# Patient Record
Sex: MALE | Race: WHITE
[De-identification: names, ages, dates, MRNs, and addresses within clinical notes are randomized per-mention and may not be internally consistent; named-entity substitution may affect disease eponyms.]

---

## 2020-02-16 ENCOUNTER — HOSPITAL ENCOUNTER (INPATIENT)
Dept: HOSPITAL 62 - ER | Age: 75
LOS: 2 days | Discharge: HOME | DRG: 556 | End: 2020-02-18
Attending: INTERNAL MEDICINE | Admitting: INTERNAL MEDICINE
Payer: OTHER GOVERNMENT

## 2020-02-16 DIAGNOSIS — E11.9: ICD-10-CM

## 2020-02-16 DIAGNOSIS — Z86.73: ICD-10-CM

## 2020-02-16 DIAGNOSIS — M79.81: Primary | ICD-10-CM

## 2020-02-16 DIAGNOSIS — Z79.01: ICD-10-CM

## 2020-02-16 DIAGNOSIS — T45.515A: ICD-10-CM

## 2020-02-16 DIAGNOSIS — Z79.84: ICD-10-CM

## 2020-02-16 DIAGNOSIS — M19.90: ICD-10-CM

## 2020-02-16 DIAGNOSIS — J44.9: ICD-10-CM

## 2020-02-16 DIAGNOSIS — E78.5: ICD-10-CM

## 2020-02-16 DIAGNOSIS — Z82.3: ICD-10-CM

## 2020-02-16 DIAGNOSIS — I10: ICD-10-CM

## 2020-02-16 DIAGNOSIS — D68.32: ICD-10-CM

## 2020-02-16 DIAGNOSIS — Z82.49: ICD-10-CM

## 2020-02-16 DIAGNOSIS — F17.210: ICD-10-CM

## 2020-02-16 DIAGNOSIS — Z88.0: ICD-10-CM

## 2020-02-16 LAB
ADD MANUAL DIFF: NO
ALBUMIN SERPL-MCNC: 3.6 G/DL (ref 3.5–5)
ALP SERPL-CCNC: 77 U/L (ref 38–126)
ANION GAP SERPL CALC-SCNC: 9 MMOL/L (ref 5–19)
APPEARANCE UR: CLEAR
APTT PPP: YELLOW S
AST SERPL-CCNC: 21 U/L (ref 17–59)
BASOPHILS # BLD AUTO: 0.1 10^3/UL (ref 0–0.2)
BASOPHILS NFR BLD AUTO: 0.8 % (ref 0–2)
BILIRUB DIRECT SERPL-MCNC: 0 MG/DL (ref 0–0.4)
BILIRUB SERPL-MCNC: 0.7 MG/DL (ref 0.2–1.3)
BILIRUB UR QL STRIP: NEGATIVE
BUN SERPL-MCNC: 15 MG/DL (ref 7–20)
CALCIUM: 8.8 MG/DL (ref 8.4–10.2)
CHLORIDE SERPL-SCNC: 100 MMOL/L (ref 98–107)
CO2 SERPL-SCNC: 26 MMOL/L (ref 22–30)
EOSINOPHIL # BLD AUTO: 0.1 10^3/UL (ref 0–0.6)
EOSINOPHIL NFR BLD AUTO: 1.6 % (ref 0–6)
ERYTHROCYTE [DISTWIDTH] IN BLOOD BY AUTOMATED COUNT: 13.6 % (ref 11.5–14)
GLUCOSE SERPL-MCNC: 187 MG/DL (ref 75–110)
GLUCOSE UR STRIP-MCNC: 50 MG/DL
HCT VFR BLD CALC: 43.8 % (ref 37.9–51)
HGB BLD-MCNC: 15.5 G/DL (ref 13.5–17)
INR PPP: 5.85
KETONES UR STRIP-MCNC: NEGATIVE MG/DL
LYMPHOCYTES # BLD AUTO: 1.1 10^3/UL (ref 0.5–4.7)
LYMPHOCYTES NFR BLD AUTO: 11.7 % (ref 13–45)
MCH RBC QN AUTO: 31.6 PG (ref 27–33.4)
MCHC RBC AUTO-ENTMCNC: 35.5 G/DL (ref 32–36)
MCV RBC AUTO: 89 FL (ref 80–97)
MONOCYTES # BLD AUTO: 1 10^3/UL (ref 0.1–1.4)
MONOCYTES NFR BLD AUTO: 10.9 % (ref 3–13)
NEUTROPHILS # BLD AUTO: 7.1 10^3/UL (ref 1.7–8.2)
NEUTS SEG NFR BLD AUTO: 75 % (ref 42–78)
PH UR STRIP: 5 [PH] (ref 5–9)
PLATELET # BLD: 266 10^3/UL (ref 150–450)
POTASSIUM SERPL-SCNC: 3.5 MMOL/L (ref 3.6–5)
PROT SERPL-MCNC: 6.1 G/DL (ref 6.3–8.2)
PROT UR STRIP-MCNC: NEGATIVE MG/DL
PROTHROMBIN TIME: 54.2 SEC (ref 11.4–15.4)
RBC # BLD AUTO: 4.91 10^6/UL (ref 4.35–5.55)
SP GR UR STRIP: > 1.06
TOTAL CELLS COUNTED % (AUTO): 100 %
UROBILINOGEN UR-MCNC: NEGATIVE MG/DL (ref ?–2)
WBC # BLD AUTO: 9.4 10^3/UL (ref 4–10.5)

## 2020-02-16 PROCEDURE — 83735 ASSAY OF MAGNESIUM: CPT

## 2020-02-16 PROCEDURE — 36415 COLL VENOUS BLD VENIPUNCTURE: CPT

## 2020-02-16 PROCEDURE — 81001 URINALYSIS AUTO W/SCOPE: CPT

## 2020-02-16 PROCEDURE — 85610 PROTHROMBIN TIME: CPT

## 2020-02-16 PROCEDURE — 80053 COMPREHEN METABOLIC PANEL: CPT

## 2020-02-16 PROCEDURE — 96374 THER/PROPH/DIAG INJ IV PUSH: CPT

## 2020-02-16 PROCEDURE — 93010 ELECTROCARDIOGRAM REPORT: CPT

## 2020-02-16 PROCEDURE — 82962 GLUCOSE BLOOD TEST: CPT

## 2020-02-16 PROCEDURE — 83690 ASSAY OF LIPASE: CPT

## 2020-02-16 PROCEDURE — 74022 RADEX COMPL AQT ABD SERIES: CPT

## 2020-02-16 PROCEDURE — 99285 EMERGENCY DEPT VISIT HI MDM: CPT

## 2020-02-16 PROCEDURE — 96361 HYDRATE IV INFUSION ADD-ON: CPT

## 2020-02-16 PROCEDURE — 85027 COMPLETE CBC AUTOMATED: CPT

## 2020-02-16 PROCEDURE — 83036 HEMOGLOBIN GLYCOSYLATED A1C: CPT

## 2020-02-16 PROCEDURE — 74177 CT ABD & PELVIS W/CONTRAST: CPT

## 2020-02-16 PROCEDURE — 93005 ELECTROCARDIOGRAM TRACING: CPT

## 2020-02-16 PROCEDURE — 85025 COMPLETE CBC W/AUTO DIFF WBC: CPT

## 2020-02-16 PROCEDURE — 96375 TX/PRO/DX INJ NEW DRUG ADDON: CPT

## 2020-02-16 RX ADMIN — FENTANYL CITRATE PRN MCG: 50 INJECTION INTRAMUSCULAR; INTRAVENOUS at 21:49

## 2020-02-16 RX ADMIN — FENTANYL CITRATE PRN MCG: 50 INJECTION INTRAMUSCULAR; INTRAVENOUS at 23:08

## 2020-02-16 NOTE — ER DOCUMENT REPORT
ED Medical Screen (RME)





- General


Chief Complaint: Abdominal Pain


Stated Complaint: ABDOMINAL PAIN


Time Seen by Provider: 02/16/20 19:16


Mode of Arrival: Wheelchair


Information source: Patient


Notes: 





74-year-old male presented to ED for complaint of lower left quadrant abdominal 

pain for the last for 5 days.  He states he was laid in the bed for about 2 

weeks for nausea vomiting diarrhea and fever before this pain started.  He 

states he has had more multiple colonoscopies and is never been told he had 

diverticulitis he states he has had a few strokes and is diabetic type II.  He 

does smoke about 5 or 6 cigarettes a day.  States he has not drank alcohol in 

the last year denies use of illicit drugs.  He states he does live alone.  He 

states the last time he had a fever was about 5 days ago he woke up was hungry 

and then he started with the abdominal pain the next day.  States his last bowel

movement was yesterday and was soft.  He states he is urinating with no 

problems.

















I have greeted and performed a rapid initial assessment of this patient.  A 

comprehensive ED assessment and evaluation of the patient, analysis of test 

results and completion of medical decision making process will be conducted by 

an additional ED providers.  Urinating okay


TRAVEL OUTSIDE OF THE [place] IN LAST 30 DAYS: No





- Related Data


Allergies/Adverse Reactions: 


                                        





Penicillins Allergy (Unknown, Verified 11/27/15 07:28)


   











Past Medical History





- Past Medical History


Cardiac Medical History: Reports: Hx Hypertension


Pulmonary Medical History: Reports: Hx COPD


   Denies: Hx Tuberculosis


Endocrine Medical History: Reports: Hx Diabetes Mellitus Type 2


Psychiatric Medical History: 


   Denies: Hx Depression


Past Surgical History: Reports: Hx Tonsillectomy





- Immunizations


Hx Diphtheria, Pertussis, Tetanus Vaccination: Yes

## 2020-02-16 NOTE — RADIOLOGY REPORT (SQ)
EXAM DESCRIPTION: 



XR ABDOMEN SUPINE AND ERECT WITH CHEST (ABD ACUTE SERIES)



COMPLETED DATE/TME:  02/16/2020 00:00



CLINICAL HISTORY: 



74 years, Male, ABDOMINAL PAIN



COMPARISON:

None.



NUMBER OF VIEWS:





TECHNIQUE:





LIMITATIONS:

None.



FINDINGS:



There are multiple loops of normal caliber, but gas containing

small bowel in the abdomen and pelvis, possibly small bowel

ileus.



No free air.



No evidence of pulmonary infiltrate or pleural effusion.



The heart and mediastinum are unremarkable



Pulmonary vascularity appears normal.



There are atherosclerotic changes and tortuosity of the thoracic

aorta.



There is an old fracture of the lateral right clavicle.



IMPRESSION:



Possible small bowel ileus.

 



copyright 2011 [employer] [facility]- All Rights Reserved

## 2020-02-16 NOTE — RADIOLOGY REPORT (SQ)
EXAM DESCRIPTION: 



CT ABDOMEN PELVIS WITH IV CONTRAST



COMPLETED DATE/TME:  02/16/2020 21:03



CLINICAL HISTORY: 



74 years, Male, Left lower quadrant abdominal pain



COMPARISON:

None.



TECHNIQUE:

Images stored on PACS.

 

All CT scanners at this facility use dose modulation, iterative

reconstruction, and/or weight based dosing when appropriate to

reduce radiation dose to as low as reasonably achievable (ALARA).





[facility]: Dose Right [facility]: CareDose   [facility]: Dose Right    [facility]:

Teradose 4D    [facility]: Smart Technologies



LIMITATIONS:

None.



FINDINGS:



Lung base is grossly clear. The heart is of normal size. No

effusion.



The liver is homogeneous. Gallbladder is nondistended. The

pancreas spleen and adrenals are unremarkable.. Nonobstructing

nephrolithiasis of the right kidney, the largest measuring 5 mm.

No obstructive uropathy hydronephrosis or hydroureter.



The bowel is nonobstructed. Diverticulosis without acute

diverticulitis. The appendix is normal.



Rectus sheath hematoma is identified left lateral. This is

moderate-sized.



Age-appropriate osteoarthritis





IMPRESSION:



Rectus sheath hematoma, left lateral

 

TECHNICAL DOCUMENTATION:



Quality ID # 436: Final reports with documentation of one or more

dose reduction techniques (e.g., Automated exposure control,

adjustment of the mA and/or kV according to patient size, use of

iterative reconstruction technique)



copyright 2011 [facility] [employer]- All Rights Reserved

## 2020-02-16 NOTE — ER DOCUMENT REPORT
ED General





- General


Chief Complaint: Abdominal Pain


Stated Complaint: ABDOMINAL PAIN


Time Seen by Provider: 02/16/20 19:16


Primary Care Provider: 


[facility] [[facility] - Follow up as needed


Mode of Arrival: Wheelchair


TRAVEL OUTSIDE OF THE [place] IN LAST 30 DAYS: No





- HPI


Notes: 





Mr. Neff is a 74-year-old male [facility] with multiple chronic medical 

problems now presenting with a 3 to 4-day history of worsening left lower 

quadrant abdominal pain aggravated by movement.  Several loose stools today.  No

recent antibiotic therapy.  States that he had a colonoscopy about 5 years ago 

was told he had benign polyps.  No abdominal surgery.  No known history of 

diverticulosis.  No urinary symptoms.  Nausea without vomiting.


Pain is currently 3/10 intensity.





Patient is currently being treated for hypertension, hyperlipidemia, COPD, d

iabetes mellitus type 2, old CVA and osteoarthritis.





Medications include clonidine, metformin, warfarin.





History of anaphylactic reaction to penicillin.





Smokes 1/4 pack cigarettes per day.  Denies alcohol consumption.  No history of 

drug abuse.








- Related Data


Allergies/Adverse Reactions: 


                                        





Penicillins Allergy (Unknown, Verified 11/27/15 07:28)


   











Past Medical History





- General


Information source: Patient, Relative





- Social History


Smoking Status: Current Every Day Smoker


Frequency of alcohol use: None


Drug Abuse: None


Lives with: Family


Family History: CAD, CVA, Malignancy


Patient has suicidal ideation: No


Patient has homicidal ideation: No





- Past Medical History


Cardiac Medical History: Reports: Hx Hypertension


Pulmonary Medical History: Reports: Hx COPD


   Denies: Hx Tuberculosis


Neurological Medical History: Reports: Hx Cerebrovascular Accident


Endocrine Medical History: Reports: Hx Diabetes Mellitus Type 2


GI Medical History: Reports: Hx Colonoscopy


Musculoskeletal Medical History: Reports Hx Arthritis


Psychiatric Medical History: 


   Denies: Hx Depression


Past Surgical History: Reports: Hx Oral Surgery - widsom teeth, Hx Tonsillectomy





- Immunizations


Hx Diphtheria, Pertussis, Tetanus Vaccination: Yes


Hx Pneumococcal Vaccination: 11/01/12





Review of Systems





- Review of Systems


Notes: 





Constitutional: Negative for fever.


HENT: Negative for sore throat.


Eyes: Negative for visual changes.


Cardiovascular: Negative for chest pain.


Respiratory: Negative for shortness of breath.


Gastrointestinal: As per HPI.


Genitourinary: Negative for dysuria.


Musculoskeletal: Negative for back pain.


Skin: Negative for rash.


Neurological: Negative for headaches, weakness or numbness.





10 point ROS negative except as marked above and in HPI.








Physical Exam





- Vital signs


Vitals: 


                                        











Temp Pulse Resp BP Pulse Ox


 


 98.4 F   99   20   119/68   95 


 


 02/16/20 19:16  02/16/20 19:16  02/16/20 19:16  02/16/20 19:16  02/16/20 19:16














- Notes


Notes: 











GENERAL: Well-developed well-nourished appearing in no acute distress.





SKIN: Good turgor no rashes.





HEAD: Normocephalic atraumatic.





EYES: PERRLA.  EOMI.  Conjunctivae and sclerae clear.





EARS: CANALS AND TMS CLEAR.





NOSE: CLEAR.





MOUTH: Moist mucosa.  Poor dentition.  No stridor or edema.  No drooling.





NECK: Supple.  No masses or thyromegaly.  No adenopathy.  Carotids 2+ without 

bruits.  No JVD.





BACK: Symmetrical without tenderness.





CHEST: Respirations unlabored.  Breath sounds clear and symmetrical.





HEART: Regular rhythm.  No murmur gallop or rub.





ABDOMEN: Mild obesity.  Moderate tenderness on deep palpation left lower 

quadrant.  Soft without masses, organomegaly or rebound.  Bowel sounds normally 

active.  No bruits.





GENITALIA: Deferred.





EXTREMITIES: Mild degenerative changes intrerphalangeal joints of both hands.  

No edema.  No calf tenderness.  Cap refill less than 1.5 seconds.  Dorsalis 

pedis and posterior tibial pulses 3+ and symmetrical.





NEUROLOGICAL: GCS 15.  Alert and oriented x3.  Normal gait.  Fluent speech.  

Cranial nerves II through XII intact.  Sensorimotor and cerebellar normal.  

Normal tone.





PSYCHIATRIC: Appropriate affect.





Course





- Re-evaluation


Re-evalutation: 





02/16/20 21:11


I would strongly suspect diverticulosis with diverticulitis.  CT is requested.  

Labs pending include CBC, urinalysis, comprehensive metabolic profile.


02/16/20 23:33


CT scan demonstrates a moderate size left lateral rectus sheath hematoma.  INR 

is approximately 5.5.  We will reverse the warfarin with IV vitamin K.  Case has

been discussed with on-call surgicalist YURIY Kovacs.  He does not feel there 

is any surgical intervention at this time and recommends medical admission.  

Case been discussed with on-call hospitalist Dr. Kruse who will admit him to 

telemetry.





- Vital Signs


Vital signs: 


                                        











Temp Pulse Resp BP Pulse Ox


 


 98.4 F   76   16   159/69 H  98 


 


 02/16/20 23:15  02/16/20 23:15  02/16/20 23:15  02/16/20 23:15  02/16/20 23:15














- Laboratory


Result Diagrams: 


                                 02/16/20 20:00





                                 02/16/20 20:00


Laboratory results interpreted by me: 


                                        











  02/16/20 02/16/20 02/16/20





  20:00 20:00 20:45


 


Lymph % (Auto)  11.7 L  


 


PT    54.2 H*


 


INR    5.85 H*


 


Sodium   134.7 L 


 


Potassium   3.5 L 


 


Glucose   187 H 


 


Total Protein   6.1 L 














Discharge





- Discharge


Clinical Impression: 


Rectus sheath hematoma


Qualifiers:


 Encounter type: initial encounter Qualified Code(s): S30.1XXA - Contusion of 

abdominal wall, initial encounter





Warfarin toxicity


Qualifiers:


 Encounter type: initial encounter Injury intent: accidental or unintentional 

Qualified Code(s): T45.511A - Poisoning by anticoagulants, accidental 

(unintentional), initial encounter





Condition: Good


Disposition: ADMITTED AS INPATIENT


Admitting Provider: Uriah (Hospitalist)


Unit Admitted: Telemetry


Referrals: 


[facility] [[facility] - Follow up as needed

## 2020-02-17 LAB
ADD MANUAL DIFF: NO
BASOPHILS # BLD AUTO: 0.1 10^3/UL (ref 0–0.2)
BASOPHILS NFR BLD AUTO: 0.8 % (ref 0–2)
EOSINOPHIL # BLD AUTO: 0.2 10^3/UL (ref 0–0.6)
EOSINOPHIL NFR BLD AUTO: 3.2 % (ref 0–6)
ERYTHROCYTE [DISTWIDTH] IN BLOOD BY AUTOMATED COUNT: 13 % (ref 11.5–14)
HCT VFR BLD CALC: 38.5 % (ref 37.9–51)
HGB BLD-MCNC: 13.5 G/DL (ref 13.5–17)
INR PPP: 1.71
LYMPHOCYTES # BLD AUTO: 1.3 10^3/UL (ref 0.5–4.7)
LYMPHOCYTES NFR BLD AUTO: 21.2 % (ref 13–45)
MCH RBC QN AUTO: 31.1 PG (ref 27–33.4)
MCHC RBC AUTO-ENTMCNC: 35 G/DL (ref 32–36)
MCV RBC AUTO: 89 FL (ref 80–97)
MONOCYTES # BLD AUTO: 0.8 10^3/UL (ref 0.1–1.4)
MONOCYTES NFR BLD AUTO: 13.6 % (ref 3–13)
NEUTROPHILS # BLD AUTO: 3.8 10^3/UL (ref 1.7–8.2)
NEUTS SEG NFR BLD AUTO: 61.2 % (ref 42–78)
PLATELET # BLD: 239 10^3/UL (ref 150–450)
PROTHROMBIN TIME: 20.3 SEC (ref 11.4–15.4)
RBC # BLD AUTO: 4.32 10^6/UL (ref 4.35–5.55)
TOTAL CELLS COUNTED % (AUTO): 100 %
WBC # BLD AUTO: 6.2 10^3/UL (ref 4–10.5)

## 2020-02-17 RX ADMIN — FENTANYL CITRATE PRN MCG: 50 INJECTION INTRAMUSCULAR; INTRAVENOUS at 11:30

## 2020-02-17 RX ADMIN — INSULIN LISPRO SCH: 100 INJECTION, SOLUTION INTRAVENOUS; SUBCUTANEOUS at 22:02

## 2020-02-17 RX ADMIN — INSULIN LISPRO SCH: 100 INJECTION, SOLUTION INTRAVENOUS; SUBCUTANEOUS at 10:23

## 2020-02-17 RX ADMIN — DOCUSATE SODIUM SCH: 100 CAPSULE, LIQUID FILLED ORAL at 19:01

## 2020-02-17 RX ADMIN — KETOROLAC TROMETHAMINE PRN MG: 30 INJECTION, SOLUTION INTRAMUSCULAR at 01:44

## 2020-02-17 RX ADMIN — DOCUSATE SODIUM SCH MG: 100 CAPSULE, LIQUID FILLED ORAL at 10:24

## 2020-02-17 RX ADMIN — KETOROLAC TROMETHAMINE PRN MG: 30 INJECTION, SOLUTION INTRAMUSCULAR at 20:04

## 2020-02-17 RX ADMIN — INSULIN LISPRO SCH: 100 INJECTION, SOLUTION INTRAVENOUS; SUBCUTANEOUS at 14:42

## 2020-02-17 RX ADMIN — INSULIN LISPRO SCH: 100 INJECTION, SOLUTION INTRAVENOUS; SUBCUTANEOUS at 18:59

## 2020-02-17 RX ADMIN — FENTANYL CITRATE PRN MCG: 50 INJECTION INTRAMUSCULAR; INTRAVENOUS at 06:11

## 2020-02-17 NOTE — EKG REPORT
SEVERITY:- BORDERLINE ECG -

SINUS RHYTHM

BORDERLINE T WAVE ABNORMALITIES

:

Confirmed by: Butch Anand MD 17-Feb-2020 06:11:05

## 2020-02-17 NOTE — PROGRESS NOTE
Provider Note


Provider Note: 





I was contacted by the emergency room physician about an opinion regarding a 

rectus sheath hematoma.  I have reviewed the patient's CT scan.  Surgery has no 

place in the treatment of a spontaneous rectus sheath hematoma.  Treatment 

strategies are entirely medical.  There is no surgery that will be helpful for 

this patient.  This was discussed at length with the emergency department 

physician.  I will cancel the consult for surgery.

## 2020-02-17 NOTE — PDOC PROGRESS REPORT
Subjective


Progress Note for:: 02/17/20


Subjective:: 





Patient states that still having some abdominal pain.  Denies any l

ightheadedness fever or chills.  Patient does not know why he is on warfarin.  

States that he had a brain bleed before and that he was later placed on 

warfarin.  When asked patient if he has had ischemic stroke, history of atrial 

fibrillation, blood clots, DVT PE, mechanical heart valves, patient's answers no

but states that he has had a stroke before with a brain bleed.  He is not able 

to give me any indication for proper reason for anticoagulation at this time 

[facility]


Reason For Visit: 


SUPRATHER INR RECTAL HEMATOMA








Physical Exam


Vital Signs: 


                                        











Temp Pulse Resp BP Pulse Ox


 


 97.7 F   73   16   110/56 L  96 


 


 02/17/20 11:17  02/17/20 14:00  02/17/20 11:17  02/17/20 11:17  02/17/20 11:17








                                 Intake & Output











 02/16/20 02/17/20 02/18/20





 06:59 06:59 06:59


 


Intake Total  1240 1616


 


Output Total  0 250


 


Balance  1240 1366


 


Weight  92.1 kg 92.1 kg











General appearance: PRESENT: no acute distress, cooperative


Neck exam: ABSENT: JVD


Respiratory exam: PRESENT: clear to auscultation mundo, symmetrical, unlabored.  

ABSENT: tachypnea, wheezes


Cardiovascular exam: PRESENT: RRR, +S1, +S2.  ABSENT: tachycardia


GI/Abdominal exam: PRESENT: soft.  ABSENT: rebound, rigid, tenderness


Neurological exam: PRESENT: alert, awake, oriented to person, oriented to place,

oriented to time


Psychiatric exam: ABSENT: agitated, anxious





Results


Laboratory Results: 


                                        





                                 02/17/20 05:32 





                                 02/16/20 20:00 





                                        











  02/16/20 02/16/20 02/16/20





  20:00 20:00 20:00


 


WBC  9.4  


 


RBC  4.91  


 


Hgb  15.5  


 


Hct  43.8  


 


MCV  89  


 


MCH  31.6  


 


MCHC  35.5  


 


RDW  13.6  


 


Plt Count  266  


 


Seg Neutrophils %  75.0  


 


Sodium   134.7 L 


 


Potassium   3.5 L 


 


Chloride   100 


 


Carbon Dioxide   26 


 


Anion Gap   9 


 


BUN   15 


 


Creatinine   0.94 


 


Est GFR ( Amer)   > 60 


 


Glucose   187 H 


 


Calcium   8.8 


 


Magnesium    2.0


 


Total Bilirubin   0.7 


 


AST   21 


 


Alkaline Phosphatase   77 


 


Total Protein   6.1 L 


 


Albumin   3.6 


 


Lipase   67.6 


 


Urine Color   


 


Urine Appearance   


 


Urine pH   


 


Ur Specific Gravity   


 


Urine Protein   


 


Urine Glucose (UA)   


 


Urine Ketones   


 


Urine Blood   


 


Urine RBC (Auto)   














  02/16/20 02/17/20





  23:34 05:32


 


WBC   6.2


 


RBC   4.32 L


 


Hgb   13.5


 


Hct   38.5


 


MCV   89


 


MCH   31.1


 


MCHC   35.0


 


RDW   13.0


 


Plt Count   239


 


Seg Neutrophils %   61.2


 


Sodium  


 


Potassium  


 


Chloride  


 


Carbon Dioxide  


 


Anion Gap  


 


BUN  


 


Creatinine  


 


Est GFR (African Amer)  


 


Glucose  


 


Calcium  


 


Magnesium  


 


Total Bilirubin  


 


AST  


 


Alkaline Phosphatase  


 


Total Protein  


 


Albumin  


 


Lipase  


 


Urine Color  YELLOW 


 


Urine Appearance  CLEAR 


 


Urine pH  5.0 


 


Ur Specific Gravity  > 1.060 


 


Urine Protein  NEGATIVE 


 


Urine Glucose (UA)  50 H 


 


Urine Ketones  NEGATIVE 


 


Urine Blood  SMALL H 


 


Urine RBC (Auto)  4 











Impressions: 


                                        





Acute Abdomen Series  02/16/20 00:00


IMPRESSION:


 


Possible small bowel ileus.


 


 


copyright 2011 [employer]- All Rights Reserved


 








Abdomen/Pelvis CT  02/16/20 21:03


IMPRESSION:


 


Rectus sheath hematoma, left lateral


 


TECHNICAL DOCUMENTATION:


 


Quality ID # 436: Final reports with documentation of one or more


dose reduction techniques (e.g., Automated exposure control,


adjustment of the mA and/or kV according to patient size, use of


iterative reconstruction technique)


 


copyright 2011 [employer]- All Rights Reserved


 














Assessment and Plan





- Diagnosis


(1) Rectus sheath hematoma


Qualifiers: 


   Encounter type: initial encounter   Qualified Code(s): S30.1XXA - Contusion 

of abdominal wall, initial encounter   


Is this a current diagnosis for this admission?: Yes   


Plan: 


Secondary to supratherapeutic INR and coughing fits, conservative management, 

Coumadin held


INR is 1.7 today following vitamin K IV dose yesterday.


Will place abdominal binder


Check CBC in the morning








(2) Warfarin toxicity


Qualifiers: 


   Encounter type: initial encounter   Injury intent: accidental or 

unintentional   Qualified Code(s): T45.511A - Poisoning by anticoagulants, 

accidental (unintentional), initial encounter   


Is this a current diagnosis for this admission?: Yes   


Plan: 


Patient's takes warfarin 5 mg on Monday Wednesday and Friday and 10 mg on the 

other days.


Patient is unable to tell me a clear indication for his warfarin use but states 

that [facility]


I have instructed him that I will be holding his warfarin which will remain on 

hold upon discharge until he follows up with [facility].


Of note, patient denies any history of atrial fibrillation/irregular heart 

rhythm, mechanical heart valves, PEs or DVTs.








(3) Diabetes mellitus


Is this a current diagnosis for this admission?: Yes   


Plan: 


Hemoglobin A1c is 6.9.  Hold metformin for 48 hours from the time of the CT.  

Continue sliding scale insulin and Accu-Cheks.

## 2020-02-17 NOTE — PDOC H&P
History of Present Illness


Admission Date/PCP: 


  20 23:47





  [facility]





Patient complains of: Rectal pain


History of Present Illness: 


JAYME MARCOS JR is a 74 year old male with a past medical history of hypertension 

dyslipidemia COPD diabetes, osteoarthritis and CVA without residual on Coumadin.

 Patient presents with 48 hours of rectal pain occurring after a paroxysm of 

cough.  In the emergency department he is found to have a supratherapeutic INR 

of 5.8 and a moderate rectal sheath hematoma.  He received symptomatic 

management, vitamin K and referred to the hospitalist for admission.  Patient 

admits recent influenza prompting several doses of Tamiflu but otherwise no 

changes in medications his last check of INR was over a month ago.  He otherwise

feels well and denies blood per rectum, fever or chills





Past Medical History


Cardiac Medical History: Reports: Hypertension


Pulmonary Medical History: Reports: Chronic Obstructive Pulmonary Disease (COPD)


   Denies: Tuberculosis


Endocrine Medical History: Reports: Diabetes Mellitus Type 2


Musculoskeltal Medical History: Reports: Arthritis


Psychiatric Medical History: 


   Denies: Depression





Past Surgical History


Past Surgical History: Reports: Tonsillectomy





Social History


Information Source: Patient


Lives with: Family


Smoking Status: Current Every Day Smoker


Cigarettes Packs Per Day: 0.5


Electronic Cigarette use?: No


Number of Years Smokin


Frequency of Alcohol Use: None


Hx Recreational Drug Use: No


Drugs: None


Hx Prescription Drug Abuse: No





- Advance Directive


Resuscitation Status: Full Code





Family History


Family History: CAD, CVA, Malignancy


Parental Family History Reviewed: Yes


Children Family History Reviewed: Yes


Sibling(s) Family History Reviewed.: Yes





Medication/Allergy


Home Medications: 








Metformin HCl [Glucophage 1000 mg Tablet] 1,000 mg PO DAILY 13 


Atorvastatin Calcium 40 mg PO DAILY 11/27/15 


Cyanocobalamin (Vitamin B-12) [Vitamin B12] 1,000 mcg PO DAILY 11/27/15 


Glipizide [Glucotrol 10 mg Tablet] 10 mg PO DAILY 11/27/15 


Pioglitazone HCl [Actos] 15 mg PO DAILY 11/27/15 


Topiramate 100 mg PO PRN PRN 11/27/15 


Aspirin [Ecotrin 81 mg EC Tablet] 81 mg PO DAILY #0 tabec 12/01/15 


Glipizide/Metformin HCl [Glipizide-Metformin 5-500 mg] 1 each PO BID #60 

12/01/15 


Warfarin Sodium [Coumadin 5 mg Tablet] 5 mg PO QHS #30 tablet 12/01/15 








Allergies/Adverse Reactions: 


                                        





Penicillins Allergy (Unknown, Verified 11/27/15 07:28)


   











Review of Systems


Constitutional: ABSENT: chills, fever(s), headache(s), weight gain, weight loss


Eyes: ABSENT: visual disturbances


Ears: ABSENT: hearing changes


Cardiovascular: ABSENT: chest pain, dyspnea on exertion, edema, orthropnea, 

palpitations


Respiratory: ABSENT: cough, hemoptysis


Gastrointestinal: ABSENT: abdominal pain, constipation, diarrhea, hematemesis, 

hematochezia, nausea, vomiting


Genitourinary: ABSENT: dysuria, hematuria


Musculoskeletal: ABSENT: joint swelling


Integumentary: ABSENT: rash, wounds


Neurological: ABSENT: abnormal gait, abnormal speech, confusion, dizziness, 

focal weakness, syncope


Psychiatric: ABSENT: anxiety, depression, homidical ideation, suicidal ideation


Endocrine: ABSENT: cold intolerance, heat intolerance, polydipsia, polyuria


Hematologic/Lymphatic: ABSENT: easy bleeding, easy bruising





Physical Exam


Vital Signs: 


                                        











Temp Pulse Resp BP Pulse Ox


 


 97.5 F   67   20   103/56 L  97 


 


 20 03:17  20 03:17  20 03:17  20 03:17  20 03:17








                                 Intake & Output











 02/15/20 02/16/20 02/17/20





 11:59 11:59 11:59


 


Intake Total   1000


 


Balance   1000


 


Weight   92.1 kg











General appearance: PRESENT: no acute distress, well-developed, well-nourished


Head exam: PRESENT: atraumatic, normocephalic


Eye exam: PRESENT: conjunctiva pink, EOMI, PERRLA.  ABSENT: scleral icterus


Ear exam: PRESENT: normal external ear exam


Mouth exam: PRESENT: moist, tongue midline


Neck exam: ABSENT: carotid bruit, JVD, lymphadenopathy, thyromegaly


Respiratory exam: PRESENT: clear to auscultation mundo.  ABSENT: rales, rhonchi, 

wheezes


Cardiovascular exam: PRESENT: RRR.  ABSENT: diastolic murmur, rubs, systolic 

murmur


Pulses: PRESENT: normal dorsalis pedis pul


Vascular exam: PRESENT: normal capillary refill


GI/Abdominal exam: PRESENT: normal bowel sounds, soft, tenderness - Deep 

suprapubic pain.  ABSENT: distended, guarding, mass, organolmegaly, rebound


Rectal exam: PRESENT: deferred


Extremities exam: PRESENT: full ROM.  ABSENT: calf tenderness, clubbing, pedal 

edema


Neurological exam: PRESENT: alert, awake, oriented to person, oriented to place,

oriented to time, oriented to situation, CN II-XII grossly intact.  ABSENT: 

motor sensory deficit


Psychiatric exam: PRESENT: appropriate affect, normal mood.  ABSENT: homicidal 

ideation, suicidal ideation


Skin exam: PRESENT: dry, intact, warm.  ABSENT: cyanosis, rash





Results


Laboratory Results: 


                                        





                                 20 20:00 





                                 20 20:00 





                                        











  20





  20:00 20:00 20:00


 


WBC  9.4  


 


RBC  4.91  


 


Hgb  15.5  


 


Hct  43.8  


 


MCV  89  


 


MCH  31.6  


 


MCHC  35.5  


 


RDW  13.6  


 


Plt Count  266  


 


Seg Neutrophils %  75.0  


 


Sodium   134.7 L 


 


Potassium   3.5 L 


 


Chloride   100 


 


Carbon Dioxide   26 


 


Anion Gap   9 


 


BUN   15 


 


Creatinine   0.94 


 


Est GFR ( Amer)   > 60 


 


Glucose   187 H 


 


Calcium   8.8 


 


Magnesium    2.0


 


Total Bilirubin   0.7 


 


AST   21 


 


Alkaline Phosphatase   77 


 


Total Protein   6.1 L 


 


Albumin   3.6 


 


Lipase   67.6 


 


Urine Color   


 


Urine Appearance   


 


Urine pH   


 


Ur Specific Gravity   


 


Urine Protein   


 


Urine Glucose (UA)   


 


Urine Ketones   


 


Urine Blood   


 


Urine RBC (Auto)   














  20





  23:34


 


WBC 


 


RBC 


 


Hgb 


 


Hct 


 


MCV 


 


MCH 


 


MCHC 


 


RDW 


 


Plt Count 


 


Seg Neutrophils % 


 


Sodium 


 


Potassium 


 


Chloride 


 


Carbon Dioxide 


 


Anion Gap 


 


BUN 


 


Creatinine 


 


Est GFR (African Amer) 


 


Glucose 


 


Calcium 


 


Magnesium 


 


Total Bilirubin 


 


AST 


 


Alkaline Phosphatase 


 


Total Protein 


 


Albumin 


 


Lipase 


 


Urine Color  YELLOW


 


Urine Appearance  CLEAR


 


Urine pH  5.0


 


Ur Specific Gravity  > 1.060


 


Urine Protein  NEGATIVE


 


Urine Glucose (UA)  50 H


 


Urine Ketones  NEGATIVE


 


Urine Blood  SMALL H


 


Urine RBC (Auto)  4











Impressions: 


                                        





Acute Abdomen Series  20 00:00


IMPRESSION:


 


Possible small bowel ileus.


 


 


copyright 2011 [employer]- All Rights Reserved


 








Abdomen/Pelvis CT  20 21:03


IMPRESSION:


 


Rectus sheath hematoma, left lateral


 


TECHNICAL DOCUMENTATION:


 


Quality ID # 436: Final reports with documentation of one or more


dose reduction techniques (e.g., Automated exposure control,


adjustment of the mA and/or kV according to patient size, use of


iterative reconstruction technique)


 


copyright 2011 [employer]- All Rights Reserved


 














Assessment and Plan





- Diagnosis


(1) Rectus sheath hematoma


Qualifiers: 


   Encounter type: initial encounter   Qualified Code(s): S30.1XXA - Contusion 

of abdominal wall, initial encounter   


Is this a current diagnosis for this admission?: Yes   


Plan: 


Secondary to supratherapeutic INR, conservative management, Coumadin held, 

symptomatic management clear liquid diet and stool softener.








(2) Warfarin toxicity


Qualifiers: 


   Encounter type: initial encounter   Injury intent: accidental or 

unintentional   Qualified Code(s): T45.511A - Poisoning by anticoagulants, 

accidental (unintentional), initial encounter   


Is this a current diagnosis for this admission?: Yes   


Plan: 


Complicated by hematoma, warfarin discontinued, vitamin K trial, level INR








(3) Diabetes mellitus


Is this a current diagnosis for this admission?: Yes   


Plan: 


Humalog sliding scale, follow-up A1c








- Time


Time Spent with patient: 25-34 minutes





- Inpatient Certification


Medical Necessity: Need Close Monitoring Due to Risk of Patient Decompensation

## 2020-02-18 VITALS — SYSTOLIC BLOOD PRESSURE: 128 MMHG | DIASTOLIC BLOOD PRESSURE: 64 MMHG

## 2020-02-18 LAB
ERYTHROCYTE [DISTWIDTH] IN BLOOD BY AUTOMATED COUNT: 13 % (ref 11.5–14)
HCT VFR BLD CALC: 38 % (ref 37.9–51)
HGB BLD-MCNC: 13.3 G/DL (ref 13.5–17)
MCH RBC QN AUTO: 31.4 PG (ref 27–33.4)
MCHC RBC AUTO-ENTMCNC: 35.1 G/DL (ref 32–36)
MCV RBC AUTO: 90 FL (ref 80–97)
PLATELET # BLD: 246 10^3/UL (ref 150–450)
RBC # BLD AUTO: 4.25 10^6/UL (ref 4.35–5.55)
WBC # BLD AUTO: 6.5 10^3/UL (ref 4–10.5)

## 2020-02-18 RX ADMIN — DOCUSATE SODIUM SCH: 100 CAPSULE, LIQUID FILLED ORAL at 09:12

## 2020-02-18 RX ADMIN — INSULIN LISPRO SCH: 100 INJECTION, SOLUTION INTRAVENOUS; SUBCUTANEOUS at 12:05

## 2020-02-18 RX ADMIN — INSULIN LISPRO SCH: 100 INJECTION, SOLUTION INTRAVENOUS; SUBCUTANEOUS at 08:02

## 2020-02-18 NOTE — PDOC DISCHARGE SUMMARY
Impression





- Admit/DC Date/PCP


Admission Date/Primary Care Provider: 


  02/16/20 23:47





  [facility]





Discharge Date: 02/18/20





- Discharge Diagnosis


(1) Rectus sheath hematoma


Is this a current diagnosis for this admission?: Yes   





(2) Warfarin toxicity


Is this a current diagnosis for this admission?: Yes   





(3) Diabetes mellitus


Is this a current diagnosis for this admission?: Yes   





- Additional Information


Resuscitation Status: Full Code


Discharge Diet: As Tolerated


Discharge Activity: No Lifting Over 10 Pounds, No Lifting/Push/Pulling, Slowly 

Increase Activity


Referrals: 


[facility] [[facility] - 


Home Medications: 








Metformin HCl [Glucophage 1000 mg Tablet] 1,000 mg PO BID 09/16/13 


Atorvastatin Calcium 40 mg PO QHS 11/27/15 


Glipizide [Glucotrol 10 mg Tablet] 10 mg PO QAM 11/27/15 


Glipizide [Glucotrol 5 mg Tablet] 5 mg PO QPM 02/17/20 


Lisinopril [Prinivil 10 mg Tablet] 40 mg PO DAILY 02/17/20 


Acetaminophen [Tylenol 325 mg Tablet] 650 mg PO Q4HP PRN  tablet 02/18/20 


Amlodipine Besylate [Norvasc 5 mg Tablet] 5 mg PO DAILY 02/18/20 


Loratadine [Claritin 10 mg Tablet] 20 mg PO DAILY 02/18/20 











History of Present Illiness


History of Present Illness: 


JAYME MARCOS JR is a 74 year old male with a past medical history of hypertension 

dyslipidemia COPD diabetes, osteoarthritis and CVA without residual on Coumadin.

 Patient presents with 48 hours of rectal pain occurring after a paroxysm of 

cough.  In the emergency department he is found to have a supratherapeutic INR 

of 5.8 and a moderate rectal sheath hematoma.  He received symptomatic 

management, vitamin K and referred to the hospitalist for admission.  Patient 

admits recent influenza prompting several doses of Tamiflu but otherwise no 

changes in medications his last check of INR was over a month ago.  He otherwise

feels well and denies blood per rectum, fever or chills








Hospital Course


Hospital Course: 


Patient presented with abdominal pain.  Patient's vitals showed hemodynamic 

stability.  Lab work revealed INR of 5.85.  Patient is on Coumadin but is 

uncertain as to the specific reason why [facility].  

Patient also notes recent upper respiratory infection and has been having some 

coughing fits at home.  Abdominal CT scan revealed a moderate sized hematoma 

involving his left lateral rectus sheath.  This hematoma was thought to be 

secondary to warfarin toxicity with supratherapeutic INR complicating mechanical

overuse from his coughing fits.  Patient was given IV vitamin K with improvement

of INR to 1.7.  Surgery was consulted and indicated no surgical intervention.  

Patient was placed in an abdominal binder which she will be going home with.  

Patient's hemoglobin has remained stable and as such patient is safe for 

discharge.  Patient's warfarin has been held and instructed to stay off the 

warfarin until he follows up with [facility] which she 

will be doing tomorrow.  I have also instructed patients to verify the exact 

reason why he is on warfarin from his primary provider.  Patient has been 

cautioned against any form of heavy lifting or strenuous exercising and for slow

increase his exercise activity over the next week.





Physical Exam


Vital Signs: 


                                        











Temp Pulse Resp BP Pulse Ox


 


 97.9 F   66   17   137/62 H  98 


 


 02/18/20 08:02  02/18/20 08:02  02/18/20 08:02  02/18/20 08:02  02/18/20 08:02








                                 Intake & Output











 02/17/20 02/18/20 02/19/20





 06:59 06:59 06:59


 


Intake Total 1240 2484 


 


Output Total 0 600 


 


Balance 1240 1884 


 


Weight 92.1 kg 93.5 kg 











General appearance: PRESENT: no acute distress, cooperative


Neck exam: ABSENT: JVD


Respiratory exam: PRESENT: clear to auscultation mundo, unlabored


GI/Abdominal exam: PRESENT: soft, tenderness - Minimally.  Much improved from 

yesterday..  ABSENT: guarding, rebound, rigid


Neurological exam: PRESENT: alert, awake, oriented to person, oriented to place,

oriented to time





Results


Laboratory Results: 


                                        











WBC  6.5 10^3/uL (4.0-10.5)   02/18/20  04:01    


 


RBC  4.25 10^6/uL (4.35-5.55)  L  02/18/20  04:01    


 


Hgb  13.3 g/dL (13.5-17.0)  L  02/18/20  04:01    


 


Hct  38.0 % (37.9-51.0)   02/18/20  04:01    


 


MCV  90 fl (80-97)   02/18/20  04:01    


 


MCH  31.4 pg (27.0-33.4)   02/18/20  04:01    


 


MCHC  35.1 g/dL (32.0-36.0)   02/18/20  04:01    


 


RDW  13.0 % (11.5-14.0)   02/18/20  04:01    


 


Plt Count  246 10^3/uL (150-450)   02/18/20  04:01    


 


Lymph % (Auto)  21.2 % (13-45)   02/17/20  05:32    


 


Mono % (Auto)  13.6 % (3-13)  H  02/17/20  05:32    


 


Eos % (Auto)  3.2 % (0-6)   02/17/20  05:32    


 


Baso % (Auto)  0.8 % (0-2)   02/17/20  05:32    


 


Absolute Neuts (auto)  3.8 10^3/uL (1.7-8.2)   02/17/20  05:32    


 


Absolute Lymphs (auto)  1.3 10^3/uL (0.5-4.7)   02/17/20  05:32    


 


Absolute Monos (auto)  0.8 10^3/uL (0.1-1.4)   02/17/20  05:32    


 


Absolute Eos (auto)  0.2 10^3/uL (0.0-0.6)   02/17/20  05:32    


 


Absolute Basos (auto)  0.1 10^3/uL (0.0-0.2)   02/17/20  05:32    


 


Seg Neutrophils %  61.2 % (42-78)   02/17/20  05:32    


 


PT  20.3 SEC (11.4-15.4)  H D 02/17/20  05:32    


 


INR  1.71   02/17/20  05:32    


 


INR (Anticoag Therapy)  Cancelled   02/16/20  20:00    


 


Sodium  134.7 mmol/L (137-145)  L  02/16/20  20:00    


 


Potassium  3.5 mmol/L (3.6-5.0)  L  02/16/20  20:00    


 


Chloride  100 mmol/L ()   02/16/20  20:00    


 


Carbon Dioxide  26 mmol/L (22-30)   02/16/20  20:00    


 


Anion Gap  9  (5-19)   02/16/20  20:00    


 


BUN  15 mg/dL (7-20)   02/16/20  20:00    


 


Creatinine  0.94 mg/dL (0.52-1.25)   02/16/20  20:00    


 


Est GFR ( Amer)  > 60  (>60)   02/16/20  20:00    


 


Est GFR (MDRD) Non-Af  > 60  (>60)   02/16/20  20:00    


 


Glucose  187 mg/dL ()  H  02/16/20  20:00    


 


POC Glucose  156 mg/dL ()  H  02/17/20  22:00    


 


Hemoglobin A1c %  6.9 % (4.7-6.0)  H  02/17/20  05:32    


 


Calcium  8.8 mg/dL (8.4-10.2)   02/16/20  20:00    


 


Magnesium  2.0 mg/dL (1.6-2.3)   02/16/20  20:00    


 


Total Bilirubin  0.7 mg/dL (0.2-1.3)   02/16/20  20:00    


 


Direct Bilirubin  0.0 mg/dL (0.0-0.4)   02/16/20  20:00    


 


Neonat Total Bilirubin  Not Reportable   02/16/20  20:00    


 


Neonat Direct Bilirubin  Not Reportable   02/16/20  20:00    


 


Neonat Indirect Bili  Not Reportable   02/16/20  20:00    


 


AST  21 U/L (17-59)   02/16/20  20:00    


 


ALT  17 U/L (<50)   02/16/20  20:00    


 


Alkaline Phosphatase  77 U/L ()   02/16/20  20:00    


 


Total Protein  6.1 g/dL (6.3-8.2)  L  02/16/20  20:00    


 


Albumin  3.6 g/dL (3.5-5.0)   02/16/20  20:00    


 


Lipase  67.6 U/L ()   02/16/20  20:00    


 


Urine Color  YELLOW   02/16/20  23:34    


 


Urine Appearance  CLEAR   02/16/20  23:34    


 


Urine pH  5.0  (5.0-9.0)   02/16/20  23:34    


 


Ur Specific Gravity  > 1.060   02/16/20  23:34    


 


Urine Protein  NEGATIVE mg/dL (NEGATIVE)   02/16/20  23:34    


 


Urine Glucose (UA)  50 mg/dL (NEGATIVE)  H  02/16/20  23:34    


 


Urine Ketones  NEGATIVE mg/dL (NEGATIVE)   02/16/20  23:34    


 


Urine Blood  SMALL  (NEGATIVE)  H  02/16/20  23:34    


 


Urine Nitrite (Reflex)  NEGATIVE  (NEGATIVE)   02/16/20  23:34    


 


Urine Bilirubin  NEGATIVE  (NEGATIVE)   02/16/20  23:34    


 


Urine Urobilinogen  NEGATIVE mg/dL (<2.0)   02/16/20  23:34    


 


Leukocyte Esterase Rfl  NEGATIVE  (NEGATIVE)   02/16/20  23:34    


 


Urine RBC (Auto)  4 /HPF  02/16/20  23:34    


 


Urine WBC (Reflex)  2 /HPF  02/16/20  23:34    


 


Squamous Epi Cells Auto  1 /HPF  02/16/20  23:34    


 


Urine Mucus (Auto)  RARE /LPF  02/16/20  23:34    


 


Urine Ascorbic Acid  NEGATIVE  (NEGATIVE)   02/16/20  23:34    











Impressions: 


                                        





Acute Abdomen Series  02/16/20 00:00


IMPRESSION:


 


Possible small bowel ileus.


 


 


copyright 2011 [employer]- All Rights Reserved


 








Abdomen/Pelvis CT  02/16/20 21:03


IMPRESSION:


 


Rectus sheath hematoma, left lateral


 


TECHNICAL DOCUMENTATION:


 


Quality ID # 436: Final reports with documentation of one or more


dose reduction techniques (e.g., Automated exposure control,


adjustment of the mA and/or kV according to patient size, use of


iterative reconstruction technique)


 


copyright 2011 [employer]- All Rights Reserved


 














Plan


Time Spent: Less than 30 Minutes





Stroke


Is this a Stroke Patient?: No





Acute Heart Failure





- **


Is this a Heart Failure Patient?: No